# Patient Record
Sex: MALE | Race: WHITE
[De-identification: names, ages, dates, MRNs, and addresses within clinical notes are randomized per-mention and may not be internally consistent; named-entity substitution may affect disease eponyms.]

---

## 2017-10-31 ENCOUNTER — HOSPITAL ENCOUNTER (OUTPATIENT)
Dept: HOSPITAL 10 - SUR | Age: 67
Discharge: HOME | End: 2017-10-31
Payer: COMMERCIAL

## 2017-10-31 VITALS
HEIGHT: 64 IN | BODY MASS INDEX: 27.78 KG/M2 | BODY MASS INDEX: 27.78 KG/M2 | WEIGHT: 162.7 LBS | HEIGHT: 64 IN | BODY MASS INDEX: 27.78 KG/M2 | WEIGHT: 162.7 LBS

## 2017-10-31 VITALS — HEART RATE: 66 BPM | DIASTOLIC BLOOD PRESSURE: 64 MMHG | SYSTOLIC BLOOD PRESSURE: 111 MMHG | RESPIRATION RATE: 15 BRPM

## 2017-10-31 VITALS — SYSTOLIC BLOOD PRESSURE: 128 MMHG | HEART RATE: 64 BPM | DIASTOLIC BLOOD PRESSURE: 78 MMHG | RESPIRATION RATE: 19 BRPM

## 2017-10-31 VITALS — SYSTOLIC BLOOD PRESSURE: 121 MMHG | HEART RATE: 62 BPM | RESPIRATION RATE: 14 BRPM | DIASTOLIC BLOOD PRESSURE: 76 MMHG

## 2017-10-31 VITALS — SYSTOLIC BLOOD PRESSURE: 134 MMHG | HEART RATE: 74 BPM | DIASTOLIC BLOOD PRESSURE: 99 MMHG | RESPIRATION RATE: 18 BRPM

## 2017-10-31 VITALS — DIASTOLIC BLOOD PRESSURE: 77 MMHG | HEART RATE: 60 BPM | RESPIRATION RATE: 10 BRPM | SYSTOLIC BLOOD PRESSURE: 113 MMHG

## 2017-10-31 VITALS — SYSTOLIC BLOOD PRESSURE: 124 MMHG | HEART RATE: 60 BPM | RESPIRATION RATE: 13 BRPM | DIASTOLIC BLOOD PRESSURE: 78 MMHG

## 2017-10-31 VITALS — RESPIRATION RATE: 12 BRPM | HEART RATE: 56 BPM | DIASTOLIC BLOOD PRESSURE: 72 MMHG | SYSTOLIC BLOOD PRESSURE: 122 MMHG

## 2017-10-31 VITALS — DIASTOLIC BLOOD PRESSURE: 61 MMHG | HEART RATE: 71 BPM | RESPIRATION RATE: 20 BRPM | SYSTOLIC BLOOD PRESSURE: 94 MMHG

## 2017-10-31 VITALS — SYSTOLIC BLOOD PRESSURE: 140 MMHG | RESPIRATION RATE: 18 BRPM | HEART RATE: 60 BPM | DIASTOLIC BLOOD PRESSURE: 70 MMHG

## 2017-10-31 VITALS — RESPIRATION RATE: 16 BRPM | DIASTOLIC BLOOD PRESSURE: 77 MMHG | SYSTOLIC BLOOD PRESSURE: 117 MMHG | HEART RATE: 64 BPM

## 2017-10-31 DIAGNOSIS — I10: ICD-10-CM

## 2017-10-31 DIAGNOSIS — M20.11: Primary | ICD-10-CM

## 2017-10-31 DIAGNOSIS — M21.611: ICD-10-CM

## 2017-10-31 DIAGNOSIS — M06.9: ICD-10-CM

## 2017-10-31 DIAGNOSIS — E11.9: ICD-10-CM

## 2017-10-31 PROCEDURE — 88304 TISSUE EXAM BY PATHOLOGIST: CPT

## 2017-10-31 PROCEDURE — 28299 COR HLX VLGS DOUBLE OSTEOT: CPT

## 2017-10-31 PROCEDURE — 82962 GLUCOSE BLOOD TEST: CPT

## 2017-10-31 NOTE — HPN
Date/Time of Note


Date/Time of Note


DATE: 10/31/17 


TIME: 07:26





Interval H&P Admission Note


Pt. seen H&P reviewed:  No system changes











TRACY REID DPM Oct 31, 2017 07:26

## 2017-10-31 NOTE — OPR
DATE OF OPERATION:  10/31/2017

 

PREOPERATIVE DIAGNOSIS:  Hallux abductovalgus, with bunion

formation, right foot.

 

POSTOPERATIVE DIAGNOSIS:  Hallux abductovalgus, with bunion

formation, right foot.

 

OPERATION PERFORMED:

1.   Osteotomy and bunionectomy, with fixation, 1st metatarsal

  right foot.

2.   Akin osteotomy, with fixation, proximal phalanx, hallux,

right foot.

 

SURGEON:  Geoffrey Yin DPM

 

OPERATIVE PROCEDURE:  Patient was brought to the surgical suite,

placed in the supine position.  Patient had IV sedation.  Patient

had sterile prep and drape, a pneumatic cuff at [facility] and

findings consistent with the pre and postop diagnosis.  The 1st

incision was a dorsal longitudinal incision over the 1st

metatarsophalangeal joint.  Using sharp and blunt dissection, the

incision was carried deep, and the head of the 1st metatarsal was

freed of its attachment dorsally and medially.  The medial

eminence was resected.  The area then had a horizontal V-

osteotomy, the distal part centered in the head of the 1st

metatarsal medially and then going proximally with the stems.

The stems were 60 degrees to each other.  The capital fragment

was moved lateral, impacted upon the shaft, and then a K-wire was

placed across the shaft and measured, and a 2.5, 18 mm screw was

screwed into place to hold the osteotomy site in position.  The K-

wire was removed.  The area was flush to the 1st metatarsal and

was held solid and next was turned to the proximal phalanx, which

the shaft of the proximal phalanx was freed of its attachment.

An Akin osteotomy, with the apex lateral and the base medial and

at the widest point was 3 mm.  Bone was removed, and then the

distal half of the proximal phalanx was impacted upon the

proximal part of the proximal phalanx, and the 8 x 8 staple by

[employer] was use to hold that osteotomy in place.  The

preoperative condition had now been relieved.  The area was

cleansed and subcutaneously coaptated using 3-0 Vicryl, and the

skin was coaptated using 5-0 nylon.  The area was injected with

0.5 percent Marcaine to prolong the anesthesia, and the area was

then dressed using half-inch Steri-Strips, Betadine ointment,

4x4s impregnated with Betadine solution and Artemio, with an outer

layer of Coban made into a semicompressive dressing.  The patient

tolerated surgery well and was returned to recovery room in

satisfactory condition.

 

 

 

Dictated By:  Geoffrey Yin DPM

 

/thor/zi

Job#:  [id number]/Document#:  [id number]

D:  10/31/2017 10:08

T:  10/31/2017 16:56

## 2017-10-31 NOTE — OPR
DATE OF OPERATION:  10/31/2017

 

PREOPERATIVE DIAGNOSIS:  Hallux abductovalgus, with bunion

formation, right foot.

 

POSTOPERATIVE DIAGNOSIS:  Hallux abductovalgus, with bunion

formation, right foot.

 

OPERATION PERFORMED:

1.   Osteotomy and bunionectomy, with fixation, 1st metatarsal

  right foot.

2.   Akin osteotomy, with fixation, proximal phalanx, hallux,

right foot.

 

SURGEON:  Geoffrey Yin DPM

 

OPERATIVE PROCEDURE:  Patient was brought to the surgical suite,

placed in the supine position.  Patient had IV sedation.  Patient

had sterile prep and drape, a pneumatic cuff at [facility] and

findings consistent with the pre and postop diagnosis.  The 1st

incision was a dorsal longitudinal incision over the 1st

metatarsophalangeal joint.  Using sharp and blunt dissection, the

incision was carried deep, and the head of the 1st metatarsal was

freed of its attachment dorsally and medially.  The medial

eminence was resected.  The area then had a horizontal V-

osteotomy, the distal part centered in the head of the 1st

metatarsal medially and then going proximally with the stems.

The stems were 60 degrees to each other.  The capital fragment

was moved lateral, impacted upon the shaft, and then a K-wire was

placed across the shaft and measured, and a 2.5, 18 mm screw was

screwed into place to hold the osteotomy site in position.  The K-

wire was removed.  The area was flush to the 1st metatarsal and

was held solid and next was turned to the proximal phalanx, which

the shaft of the proximal phalanx was freed of its attachment.

An Akin osteotomy, with the apex lateral and the base medial and

at the widest point was 3 mm.  Bone was removed, and then the

distal half of the proximal phalanx was impacted upon the

proximal part of the proximal phalanx, and the 8 x 8 staple by

[employer] was use to hold that osteotomy in place.  The

preoperative condition had now been relieved.  The area was

cleansed and subcutaneously coaptated using 3-0 Vicryl, and the

skin was coaptated using 5-0 nylon.  The area was injected with

0.5 percent Marcaine to prolong the anesthesia, and the area was

then dressed using half-inch Steri-Strips, Betadine ointment,

4x4s impregnated with Betadine solution and Artemio, with an outer

layer of Coban made into a semicompressive dressing.  The patient

tolerated surgery well and was returned to recovery room in

satisfactory condition.

 

 

 

Dictated By:  Geoffrey Yin DPM

 

/thor/zi

Job#:  [id number]/Document#:  [id number]

D:  10/31/2017 10:08

T:  10/31/2017 16:56

## 2017-10-31 NOTE — SIPON
Date/Time of Note


Date/Time of Note


DATE: 10/31/17 


TIME: 08:47





Operative Report


Preoperative Diagnosis


Hallux abductovalgus with bunion formation right foot


Postoperative Diagnosis


Same


Operation/Procedure Performed


osteotomy and bunionectomy with fixation first metatarsal right Akin osteotomy 

with fixation proximal phalanx hallux right with application of epifix first 

metatarsal


Surgeon


see signature line


First assist


None


Anesthesia:  general


Estimated blood loss:  minimal


Transfusion Required


   none


Specimen


Bone


Grafts/Implants


A 2.5 x 18 mm screw first metatarsal and 8 x 8 staple proximal phalanx and 

epifix 2 x 4 first metatarsal


Complications


none











TRACY REID DPM Oct 31, 2017 08:55

## 2018-05-22 ENCOUNTER — HOSPITAL ENCOUNTER (OUTPATIENT)
Age: 68
Discharge: HOME | End: 2018-05-22

## 2018-05-22 ENCOUNTER — HOSPITAL ENCOUNTER (OUTPATIENT)
Dept: HOSPITAL 91 - SDS | Age: 68
Discharge: HOME | End: 2018-05-22
Payer: COMMERCIAL

## 2018-05-22 DIAGNOSIS — E11.9: ICD-10-CM

## 2018-05-22 DIAGNOSIS — I10: ICD-10-CM

## 2018-05-22 DIAGNOSIS — M21.612: Primary | ICD-10-CM

## 2018-05-22 PROCEDURE — 28296 COR HLX VLGS DSTL MTAR OSTEO: CPT

## 2018-05-22 PROCEDURE — 82962 GLUCOSE BLOOD TEST: CPT

## 2018-05-22 PROCEDURE — 88311 DECALCIFY TISSUE: CPT

## 2018-05-22 PROCEDURE — 88304 TISSUE EXAM BY PATHOLOGIST: CPT

## 2018-05-22 RX ADMIN — BUPIVACAINE HYDROCHLORIDE 1 ML: 5 INJECTION, SOLUTION EPIDURAL; INTRACAUDAL; PERINEURAL at 08:30

## 2018-05-22 RX ADMIN — DEXAMETHASONE SODIUM PHOSPHATE 1 MG: 4 INJECTION, SOLUTION INTRAMUSCULAR; INTRAVENOUS at 00:00

## 2018-05-22 RX ADMIN — HYDROMORPHONE HYDROCHLORIDE 1 MG: 2 INJECTION INTRAMUSCULAR; INTRAVENOUS; SUBCUTANEOUS at 09:13

## 2018-05-22 RX ADMIN — FENTANYL CITRATE 1 MCG: 50 INJECTION, SOLUTION INTRAMUSCULAR; INTRAVENOUS at 09:01

## 2018-05-22 RX ADMIN — HYDROMORPHONE HYDROCHLORIDE 1 MG: 2 INJECTION INTRAMUSCULAR; INTRAVENOUS; SUBCUTANEOUS at 09:08
